# Patient Record
(demographics unavailable — no encounter records)

---

## 2024-10-24 NOTE — ASSESSMENT
[FreeTextEntry1] : 52 year old female s/p Right knee arthroscopy and medial meniscectomy (6/27/24). Patient has noticed some increased knee pain especially with work.  She was given right knee CSI which she tolerated well.  Continue ibuprofen prn.  Can consider PT but will continue home exercises for now.  F/U in 3 months. We discussed red flag symptoms that would require emergent evaluation. She knows to call with any questions or concerns or if her symptoms acutely worsen.

## 2024-10-24 NOTE — PROCEDURE
[de-identified] : Procedure: Right knee Joint injection with corticosteroid Pre-Procedure Diagnosis: right knee pain Post-Procedure Diagnosis: same  The patient was educated about the risks and benefits of a corticosteroid injection.  Alternatives were discussed.  The patient understood and consented for the procedure.  The area was sterilely prepped using isopropyl alcohol.  An ethyl chloride spray provided  local anesthesia.  Using the usual sterile technique, 1 ml of 40mg/1ml of Kenalog and 4 ml of Lidocaine 1% without epinephrine was injected into the joint.  A dressing was applied to the area.  The patient tolerated the procedure well and without complication.

## 2024-10-24 NOTE — PHYSICAL EXAM
[de-identified] : General: No acute distress, conversant, well-nourished. Head: Normocephalic, atraumatic Neck: trachea midline, FROM Heart: normotensive and normal rate and rhythm Lungs: No labored breathing Skin: No abrasions, no rashes, no edema Psych: Alert and oriented to person, place and time Extremities: no peripheral edema or digital cyanosis Gait: Normal gait. Can perform tandem gait.   Vascular: warm and well perfused distally, palpable distal pulses  Right Knee with no erythema, no effusion. incision well healed Range of motion: 0 - 130 degrees No pain with range of motion. Sensation intact to light touch. Normal motor exam. Warm and well perfused distally. The surgical incision site(s) was clean, dry and intact and not erythematous.

## 2024-10-24 NOTE — HISTORY OF PRESENT ILLNESS
[de-identified] : 52 year old female s/p Right knee arthroscopy and medial meniscectomy (6/27/24). Patient has noticed some increased knee pain especially with work.  She is accompanied by her daughter.